# Patient Record
Sex: FEMALE | Race: WHITE | NOT HISPANIC OR LATINO | Employment: UNEMPLOYED | URBAN - METROPOLITAN AREA
[De-identification: names, ages, dates, MRNs, and addresses within clinical notes are randomized per-mention and may not be internally consistent; named-entity substitution may affect disease eponyms.]

---

## 2020-07-02 ENCOUNTER — OFFICE VISIT (OUTPATIENT)
Dept: PRIMARY CARE CLINIC | Facility: CLINIC | Age: 25
End: 2020-07-02
Payer: MEDICAID

## 2020-07-02 VITALS
BODY MASS INDEX: 29.22 KG/M2 | HEIGHT: 60 IN | WEIGHT: 148.81 LBS | OXYGEN SATURATION: 99 % | DIASTOLIC BLOOD PRESSURE: 64 MMHG | TEMPERATURE: 98 F | SYSTOLIC BLOOD PRESSURE: 122 MMHG | RESPIRATION RATE: 18 BRPM | HEART RATE: 88 BPM

## 2020-07-02 DIAGNOSIS — F17.210 CIGARETTE SMOKER: ICD-10-CM

## 2020-07-02 DIAGNOSIS — S42.292K OTHER CLOSED DISPLACED FRACTURE OF PROXIMAL END OF LEFT HUMERUS WITH NONUNION, SUBSEQUENT ENCOUNTER: Primary | ICD-10-CM

## 2020-07-02 PROCEDURE — 99203 OFFICE O/P NEW LOW 30 MIN: CPT | Mod: S$GLB,,, | Performed by: NURSE PRACTITIONER

## 2020-07-02 PROCEDURE — 99203 PR OFFICE/OUTPT VISIT, NEW, LEVL III, 30-44 MIN: ICD-10-PCS | Mod: S$GLB,,, | Performed by: NURSE PRACTITIONER

## 2020-07-02 RX ORDER — ACETAMINOPHEN AND CODEINE PHOSPHATE 300; 30 MG/1; MG/1
TABLET ORAL
COMMUNITY
Start: 2020-06-26

## 2020-07-02 RX ORDER — PROMETHAZINE HYDROCHLORIDE 25 MG/1
TABLET ORAL
COMMUNITY
Start: 2020-06-26

## 2020-07-02 NOTE — PROGRESS NOTES
Subjective:       Patient ID: Marielos Perla is a 24 y.o. female.    Chief Complaint: Establish Care and Referral (Orthopedic )    HPI:    Presents with c/o left fracture to humerus. Reports fell off of roof, Went to  3 days ago, xray revealed fracture humerus and splinted arm. Needs referral to Ortho. Pain is controlled with meds received from . Denies any other issues today.         History reviewed. No pertinent past medical history.    Past Surgical History:   Procedure Laterality Date    TYMPANOSTOMY TUBE PLACEMENT Bilateral        History reviewed. No pertinent family history.    Social History     Tobacco Use    Smoking status: Current Every Day Smoker     Packs/day: 0.25     Types: Cigarettes    Smokeless tobacco: Never Used   Substance Use Topics    Alcohol use: Yes     Frequency: 2-3 times a week     Drinks per session: 7 to 9     Binge frequency: Weekly    Drug use: Not Currently       Patient Active Problem List   Diagnosis    Cigarette smoker         There is no immunization history on file for this patient.        Review of Systems   Constitutional: Negative for activity change, chills, diaphoresis, fatigue and fever.   HENT: Negative for ear pain, mouth sores, nosebleeds and trouble swallowing.    Eyes: Negative for pain and visual disturbance.   Respiratory: Negative for cough, chest tightness and shortness of breath.    Cardiovascular: Negative for chest pain, palpitations and leg swelling.   Gastrointestinal: Negative for abdominal distention, abdominal pain and blood in stool.   Endocrine: Negative for polydipsia, polyphagia and polyuria.   Genitourinary: Negative for flank pain, frequency and hematuria.   Musculoskeletal: Negative for gait problem, neck pain and neck stiffness.   Skin: Negative for color change and pallor.   Neurological: Negative for dizziness, syncope, light-headedness and headaches.   Hematological: Negative for adenopathy. Does not bruise/bleed easily.    Psychiatric/Behavioral: Negative for confusion and suicidal ideas.     Objective:     Vitals:    07/02/20 1415   BP: 122/64   BP Location: Right arm   Patient Position: Sitting   BP Method: Large (Manual)   Pulse: 88   Resp: 18   Temp: 98.1 °F (36.7 °C)   TempSrc: Temporal   SpO2: 99%   Weight: 67.5 kg (148 lb 12.8 oz)   Height: 5' (1.524 m)       Physical Exam  Vitals signs and nursing note reviewed.   Constitutional:       General: She is not in acute distress.     Appearance: She is well-developed. She is not diaphoretic.   HENT:      Head: Normocephalic and atraumatic.      Mouth/Throat:      Mouth: Mucous membranes are moist. Mucous membranes are not pale, not dry and not cyanotic.   Eyes:      Conjunctiva/sclera: Conjunctivae normal.      Pupils: Pupils are equal, round, and reactive to light.   Neck:      Musculoskeletal: Normal range of motion and neck supple.      Thyroid: No thyromegaly.      Vascular: No JVD.      Trachea: No tracheal deviation.   Cardiovascular:      Rate and Rhythm: Normal rate and regular rhythm.      Pulses: Normal pulses.      Heart sounds: Normal heart sounds. No murmur.   Pulmonary:      Effort: Pulmonary effort is normal. No respiratory distress.      Breath sounds: Normal breath sounds. No stridor. No wheezing or rales.   Abdominal:      General: Bowel sounds are normal. There is no distension.      Palpations: Abdomen is soft.      Tenderness: There is no abdominal tenderness. There is no guarding.   Musculoskeletal:      Left elbow: She exhibits decreased range of motion. Tenderness found.        Arms:    Lymphadenopathy:      Cervical: No cervical adenopathy.   Skin:     General: Skin is warm and dry.      Capillary Refill: Capillary refill takes less than 2 seconds.      Coloration: Skin is not jaundiced.      Findings: No bruising.   Neurological:      Mental Status: She is alert and oriented to person, place, and time.   Psychiatric:         Mood and Affect: Mood normal.          No visits with results within 6 Month(s) from this visit.   Latest known visit with results is:   No results found for any previous visit.         Assessment:      1. Other closed displaced fracture of proximal end of left humerus with nonunion, subsequent encounter    2. Cigarette smoker    3. 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        There are no discontinued medications.    Health Maintenance   Topic Date Due    Pap Smear  12/10/2022    TETANUS VACCINE  11/12/2029    Lipid Panel  Completed    Pneumococcal Vaccine (Medium Risk)  Discontinued    HPV Vaccines  Discontinued       Patient Instructions     RTC PRN or soon for annual physical exam.      Understanding a Humerus Fracture      When you have a humerus fracture, it means that your upper arm bone is broken.This type of fracture most often occurs along the middle of the bone or at the end of the bone near the shoulder. Less often, it occurs at the end of the bone near the elbow. This mainly happens in kids or young adults.  The bone may be cracked, or it may be broken into 2 or more pieces. The pieces of bone may be lined up or they may have moved out of place. Sometimes, the bone may break through the skin. Nearby nerves, tissues, and joints also may be damaged. Depending on the severity of the fracture, healing may take several months or longer.  What causes a humerus  fracture?  A humerus fracture is most often the result of trauma. This may be from a fall, blow, accident, or sports injury.  Symptoms of a humerus fracture  Symptoms can include pain, swelling, and bruising. If the bone breaks through the skin,  bleeding can occur at the site. It may be hard to move and use the shoulder, arm, or elbow as you would normally.  Treating a humerus fracture  Treatment depends on where the bone is broken and how serious the break is. If needed, the bone is put back into place. This may be done with or without surgery. If surgery is needed, the surgeon may use devices such as pins, plates, or screws to hold the bone together. You will then wear a sling, splint, or cast to keep the bone in place and protect it from injury during healing. Other treatments may be also used to help reduce symptoms or regain function. These include:  · Cold packs. Putting an ice pack on the injured area may help reduce swelling and pain.  · Pain medicines. Taking prescription or over-the-counter pain medicines may help reduce pain and swelling.  · Exercises. Doing certain exercises at home or with a physical therapist can help improve strength, flexibility, and range of motion in the shoulder, arm, or elbow.  Possible complications of a humerus fracture  These can include:  · Poor healing of the bone  · Weakness, stiffness, or loss of range of motion in the shoulder, arm, or elbow  · Osteoarthritis in the shoulder or elbow  When to call your healthcare provider  Call your healthcare provider right away if you have any of these:  · Fever of 100.4°F (38°C) or higher, or as directed  · Symptoms that dont get better with treatment, or get worse  · Numbness, tingling, coldness, or swelling in your arm, hand, or fingers  · Fingernails that turn blue or gray in color  · A sling, splint, or cast that is damaged or feels too tight or loose  · New symptoms   Date Last Reviewed: 3/10/2016  © 9613-4637 [employer] [place]. [address] [place] [address], [place] [address]. All rights reserved. This information is not intended as a substitute for professional medical care. Always follow your healthcare professional's instructions.        Greater than 50% time spent educating patient on plan of care, medications, treatment options, testings, and prevention; > 20min/30min.       Risks, benefits, and alternatives discussed with patient, Patient verbalized understanding of discussed plan of care. Asked patient if any further questions, answered no.    No future appointments.      ANEUDY CedeñoC

## 2020-07-02 NOTE — PATIENT INSTRUCTIONS
RTC PRN or soon for annual physical exam.      Understanding a Humerus Fracture      When you have a humerus fracture, it means that your upper arm bone is broken.This type of fracture most often occurs along the middle of the bone or at the end of the bone near the shoulder. Less often, it occurs at the end of the bone near the elbow. This mainly happens in kids or young adults.  The bone may be cracked, or it may be broken into 2 or more pieces. The pieces of bone may be lined up or they may have moved out of place. Sometimes, the bone may break through the skin. Nearby nerves, tissues, and joints also may be damaged. Depending on the severity of the fracture, healing may take several months or longer.  What causes a humerus fracture?  A humerus fracture is most often the result of trauma. This may be from a fall, blow, accident, or sports injury.  Symptoms of a humerus fracture  Symptoms can include pain, swelling, and bruising. If the bone breaks through the skin,  bleeding can occur at the site. It may be hard to move and use the shoulder, arm, or elbow as you would normally.  Treating a humerus fracture  Treatment depends on where the bone is broken and how serious the break is. If needed, the bone is put back into place. This may be done with or without surgery. If surgery is needed, the surgeon may use devices such as pins, plates, or screws to hold the bone together. You will then wear a sling, splint, or cast to keep the bone in place and protect it from injury during healing. Other treatments may be also used to help reduce symptoms or regain function. These include:  · Cold packs. Putting an ice pack on the injured area may help reduce swelling and pain.  · Pain medicines. Taking prescription or over-the-counter pain medicines may help reduce pain and swelling.  · Exercises. Doing certain exercises at home or with a physical therapist can help improve strength, flexibility, and range of motion in the  shoulder, arm, or elbow.  Possible complications of a humerus fracture  These can include:  · Poor healing of the bone  · Weakness, stiffness, or loss of range of motion in the shoulder, arm, or elbow  · Osteoarthritis in the shoulder or elbow  When to call your healthcare provider  Call your healthcare provider right away if you have any of these:  · Fever of 100.4°F (38°C) or higher, or as directed  · Symptoms that dont get better with treatment, or get worse  · Numbness, tingling, coldness, or swelling in your arm, hand, or fingers  · Fingernails that turn blue or gray in color  · A sling, splint, or cast that is damaged or feels too tight or loose  · New symptoms   Date Last Reviewed: 3/10/2016  © 5082-3569 [employer]. [address] [place] [address], [place] [address]. All rights reserved. This information is not intended as a substitute for professional medical care. Always follow your healthcare professional's instructions.

## 2020-07-09 ENCOUNTER — OFFICE VISIT (OUTPATIENT)
Dept: ORTHOPEDICS | Facility: CLINIC | Age: 25
End: 2020-07-09
Payer: MEDICAID

## 2020-07-09 VITALS — TEMPERATURE: 98 F | HEIGHT: 60 IN | BODY MASS INDEX: 29.06 KG/M2 | WEIGHT: 148 LBS

## 2020-07-09 DIAGNOSIS — S42.409A CLOSED FRACTURE OF DISTAL END OF HUMERUS, UNSPECIFIED FRACTURE MORPHOLOGY, INITIAL ENCOUNTER: ICD-10-CM

## 2020-07-09 PROCEDURE — 99203 PR OFFICE/OUTPT VISIT, NEW, LEVL III, 30-44 MIN: ICD-10-PCS | Mod: S$GLB,,, | Performed by: ORTHOPAEDIC SURGERY

## 2020-07-09 PROCEDURE — 99203 OFFICE O/P NEW LOW 30 MIN: CPT | Mod: S$GLB,,, | Performed by: ORTHOPAEDIC SURGERY

## 2020-07-09 RX ORDER — HYDROCODONE BITARTRATE AND ACETAMINOPHEN 5; 325 MG/1; MG/1
1 TABLET ORAL EVERY 12 HOURS PRN
Qty: 14 TABLET | Refills: 0 | Status: SHIPPED | OUTPATIENT
Start: 2020-07-09

## 2020-07-09 NOTE — LETTER
July 9, 2020      Lelia Potter, FNP-C  [address]  [facility]  [place] [address]           [facility]  [address]  [place] [address]  Phone: [phone]  Fax: [phone]          Patient: Marielos Perla   MR Number: [id number]   YOB: 1995   Date of Visit: 7/9/2020       Dear Lelia Potter:    Thank you for referring Marielos Perla to me for evaluation. Attached you will find relevant portions of my assessment and plan of care.    If you have questions, please do not hesitate to call me. I look forward to following Marielos Perla along with you.    Sincerely,    Reza Umaña MD    Enclosure  CC:  No Recipients    If you would like to receive this communication electronically, please contact [email or web address] or [phone] to request more information on [employer] Link access.    For providers and/or their staff who would like to refer a patient to [facility], please contact us through our one-stop-shop provider referral line, [facility], at [phone].    If you feel you have received this communication in error or would no longer like to receive these types of communications, please e-mail [email or web address]

## 2020-07-09 NOTE — PROGRESS NOTES
Subjective:      Patient ID: Marielos Perla is a 24 y.o. female.    Chief Complaint: Pain of the Left Elbow    HPI 24-year-old lady who fell off her roof full month ago and injured her left elbow.  She was x-rayed 2 weeks ago and x-rays show a minimally displaced avulsion type fracture of from the lateral epicondyle.  She complains of pain and stiffness  Review of Systems   Constitution: Negative for fever and weight loss.   Cardiovascular: Negative for chest pain and leg swelling.   Musculoskeletal: Positive for joint pain, joint swelling and stiffness. Negative for arthritis and muscle weakness.   Gastrointestinal: Negative for change in bowel habit.   Genitourinary: Negative for bladder incontinence and hematuria.   Neurological: Negative for focal weakness, numbness, paresthesias and sensory change.         Objective:      Physical examination shows tenderness of the medial epicondyle.  She has pain with valgus stress of the elbow.  She has range of motion from -15 to 100 degrees.  She has normal sensation and normal pulses      Ortho/SPM Exam            Assessment:       Encounter Diagnosis   Name Primary?    Closed fracture of distal end of humerus, unspecified fracture morphology, initial encounter           Plan:       Marielos was seen today for pain.    Diagnoses and all orders for this visit:    Closed fracture of distal end of humerus, unspecified fracture morphology, initial encounter  Comments:  refer to Ortho  Orders:  -     Ambulatory referral/consult to Orthopedics    Other orders  -     HYDROcodone-acetaminophen (NORCO) 5-325 mg per tablet; Take 1 tablet by mouth every 12 (twelve) hours as needed for Pain.      Arrangements were made for a course of physical therapy to re-gain her motion.  Return 3 weeks for recheck

## 2020-08-11 ENCOUNTER — OFFICE VISIT (OUTPATIENT)
Dept: OBSTETRICS AND GYNECOLOGY | Facility: CLINIC | Age: 25
End: 2020-08-11
Payer: MEDICAID

## 2020-08-11 VITALS
DIASTOLIC BLOOD PRESSURE: 67 MMHG | BODY MASS INDEX: 29.84 KG/M2 | SYSTOLIC BLOOD PRESSURE: 103 MMHG | HEIGHT: 60 IN | WEIGHT: 152 LBS | HEART RATE: 68 BPM

## 2020-08-11 DIAGNOSIS — Z72.0 TOBACCO USE: ICD-10-CM

## 2020-08-11 DIAGNOSIS — Z01.419 ENCOUNTER FOR ANNUAL ROUTINE GYNECOLOGICAL EXAMINATION: Primary | ICD-10-CM

## 2020-08-11 DIAGNOSIS — F12.10 TETRAHYDROCANNABINOL (THC) USE DISORDER, MILD, ABUSE: ICD-10-CM

## 2020-08-11 DIAGNOSIS — A60.9 HSV (HERPES SIMPLEX VIRUS) ANOGENITAL INFECTION: ICD-10-CM

## 2020-08-11 PROCEDURE — 99395 PREV VISIT EST AGE 18-39: CPT | Mod: S$GLB,,, | Performed by: STUDENT IN AN ORGANIZED HEALTH CARE EDUCATION/TRAINING PROGRAM

## 2020-08-11 PROCEDURE — 99395 PR PREVENTIVE VISIT,EST,18-39: ICD-10-PCS | Mod: S$GLB,,, | Performed by: STUDENT IN AN ORGANIZED HEALTH CARE EDUCATION/TRAINING PROGRAM

## 2020-08-11 RX ORDER — VALACYCLOVIR HYDROCHLORIDE 500 MG/1
500 TABLET, FILM COATED ORAL 2 TIMES DAILY
Qty: 10 TABLET | Refills: 6 | Status: SHIPPED | OUTPATIENT
Start: 2020-08-11 | End: 2020-08-16

## 2020-08-11 NOTE — PROGRESS NOTES
Chief Complaint: Annual Exam    HPI: 24 y.o.  here for Annual Exam. She reports being treated for HSV earlier this year and would like to have medication available to her should she have a recurrent lesion. She also reports being tested for HIV q 3 months 2/2 to her partner possibly having HIV. She has no other complaints today.     Review of Systems   Constitutional: Negative for chills and fever.   HENT: Negative for congestion and sore throat.    Respiratory: Negative for cough and shortness of breath.    Cardiovascular: Negative for chest pain and palpitations.   Gastrointestinal: Negative for abdominal pain, nausea and vomiting.   Genitourinary: Negative for dysuria and frequency.   Musculoskeletal: Negative for back pain and joint pain (recent injury to elbow).   Skin: Negative for itching and rash.   Neurological: Negative for weakness and headaches.   Psychiatric/Behavioral: Negative for depression and suicidal ideas.     Past Medical History: denies  Past Surgical History: denies  Past OB History:       - 6lb  Past Gyn History: + H/o HPV on pap smear in the past, most recent negative from OSH, denies STD's, menstrual history as above.   Contraception History: none  Social History: + tobacco use - 1 pack/week, Occasional EtOH use, + THC use   Family History denies breast, colon, ovarian, or uterine cancer  Medications: none  Allergies: sulfa drugs - swelling    Physical Exam:  Vitals:    20 1402   BP: 103/67   Pulse: 68   Weight: 68.9 kg (152 lb)   Height: 5' (1.524 m)     Body mass index is 29.69 kg/m².  Physical Exam   Constitutional: She is oriented to person, place, and time and well-developed, well-nourished, and in no distress.   HENT:   Head: Normocephalic and atraumatic.   Eyes: Pupils are equal, round, and reactive to light. EOM are normal.   Neck: Normal range of motion. Neck supple.   Pulmonary/Chest: Effort normal. No respiratory distress. Right breast exhibits no inverted  nipple, no mass, no nipple discharge, no skin change and no tenderness. Left breast exhibits no inverted nipple, no mass, no nipple discharge, no skin change and no tenderness. Breasts are symmetrical.   Abdominal: Soft. She exhibits no distension. There is no abdominal tenderness.   Genitourinary: Right adnexum displays no mass and no tenderness. Left adnexum displays no mass and no tenderness.    Genitourinary Comments: NEFG, no masses or lesions, vagina pink with rugae, no VD or VB, non friable cervix  BME: uterus about 6 weeks in size, mobile, non tender     Musculoskeletal: Normal range of motion.         General: No tenderness or edema.   Neurological: She is alert and oriented to person, place, and time.   Skin: Skin is warm and dry.      Labs / Significant Studies  Pap: 2019, HAILEY signed for results    ASSESSMENT:   Patient is a 24 y.o. . who presents for annual exam     Patient Active Problem List   Diagnosis    Cigarette smoker    h/o HSV (herpes simplex virus) anogenital infection    Tobacco use    Tetrahydrocannabinol (THC) use disorder, mild, abuse     PLAN:  GC/CZ today  HAILEY signed today for pap smear results  HIV testing - pt to continue testing q 3 months, encouraged pt to have partner tested, if negative she can stop the q 3 month HIV testing   Will give prescription for valtrex prn today  Pt offered contraception, pt declined   Counseled on tobacco and THC cessation   Pre-conception counseling- folic acid and PNV  Counseling on self-breast exams, diet, and exercise.

## 2020-08-14 LAB
CHLAMYDIA: NEGATIVE
GONORRHEA: NEGATIVE
SOURCE: NORMAL
SOURCE: NORMAL
TRICHOMONAS AMPLIFIED: NEGATIVE

## 2024-10-03 ENCOUNTER — OFFICE VISIT (OUTPATIENT)
Dept: OBSTETRICS AND GYNECOLOGY | Facility: CLINIC | Age: 29
End: 2024-10-03
Payer: MEDICAID

## 2024-10-03 VITALS
HEART RATE: 79 BPM | WEIGHT: 156.38 LBS | DIASTOLIC BLOOD PRESSURE: 66 MMHG | BODY MASS INDEX: 30.54 KG/M2 | SYSTOLIC BLOOD PRESSURE: 114 MMHG

## 2024-10-03 DIAGNOSIS — Z12.4 SCREENING FOR CERVICAL CANCER: ICD-10-CM

## 2024-10-03 DIAGNOSIS — N83.209 CYST OF OVARY, UNSPECIFIED LATERALITY: Primary | ICD-10-CM

## 2024-10-03 PROCEDURE — 3008F BODY MASS INDEX DOCD: CPT | Mod: CPTII,,, | Performed by: STUDENT IN AN ORGANIZED HEALTH CARE EDUCATION/TRAINING PROGRAM

## 2024-10-03 PROCEDURE — 1159F MED LIST DOCD IN RCRD: CPT | Mod: CPTII,,, | Performed by: STUDENT IN AN ORGANIZED HEALTH CARE EDUCATION/TRAINING PROGRAM

## 2024-10-03 PROCEDURE — 3078F DIAST BP <80 MM HG: CPT | Mod: CPTII,,, | Performed by: STUDENT IN AN ORGANIZED HEALTH CARE EDUCATION/TRAINING PROGRAM

## 2024-10-03 PROCEDURE — 99203 OFFICE O/P NEW LOW 30 MIN: CPT | Mod: S$PBB,,, | Performed by: STUDENT IN AN ORGANIZED HEALTH CARE EDUCATION/TRAINING PROGRAM

## 2024-10-03 PROCEDURE — 1160F RVW MEDS BY RX/DR IN RCRD: CPT | Mod: CPTII,,, | Performed by: STUDENT IN AN ORGANIZED HEALTH CARE EDUCATION/TRAINING PROGRAM

## 2024-10-03 PROCEDURE — 3074F SYST BP LT 130 MM HG: CPT | Mod: CPTII,,, | Performed by: STUDENT IN AN ORGANIZED HEALTH CARE EDUCATION/TRAINING PROGRAM

## 2024-10-03 NOTE — PROGRESS NOTES
Chief Complaint:  ovarian cyst    HPI: Patient is a 29 y.o. y.o. female  who presents to GYN clinic for history of ovarian cyst.  Patient reports she has been seen multiple times in the past with complaints of ovarian cyst.  She reports she has had a deal with this since she was a teenager.  She was told most recently at the ED at another facility that she had ovarian cyst.  She reports she had a CT and ultrasounds, she was told that she had a small amount of free fluid on 1 side and a small cyst on the other.  She was counseled on treatment options with OCPs, patient voices understanding however declined.  She was also concerned about possible development into ovarian cancer and was enquiring about screening.  She denies any family history of ovarian or breast cancer today.  She has no other complaints today..    Review of Systems   Constitutional:  Negative for chills and fever.   HENT:  Negative for congestion and sore throat.    Respiratory:  Negative for cough and shortness of breath.    Cardiovascular:  Negative for chest pain and palpitations.   Gastrointestinal:  Negative for abdominal pain, nausea and vomiting.   Genitourinary:  Negative for dysuria, frequency and urgency.   Musculoskeletal:  Negative for back pain.   Skin:  Negative for itching and rash.   Neurological:  Negative for headaches.   All other systems reviewed and are negative.    PMH: HSV  PSH: ear surgery  Obhx:   GYNhx: + abnormal pap smears, denies STD's  Social hx: + t/d/e, THC use  Family hx: denies breast, colon, ovarian or uterine cancers.   Review of patient's allergies indicates:   Allergen Reactions    Sulfa (sulfonamide antibiotics)      Current Outpatient Medications   Medication Instructions    acetaminophen-codeine 300-30mg (TYLENOL #3) 300-30 mg Tab No dose, route, or frequency recorded.    HYDROcodone-acetaminophen (NORCO) 5-325 mg per tablet 1 tablet, Oral, Every 12 hours PRN    promethazine (PHENERGAN) 25 MG tablet  No dose, route, or frequency recorded.    valACYclovir (VALTREX) 500 mg, Oral, 2 times daily     Physical Exam:  Vitals:    10/03/24 0905   BP: 114/66   Pulse: 79   Weight: 70.9 kg (156 lb 6.4 oz)     Gen: NAD, well developed, well nourished  Psych: alert and oriented x 3, normal affect  HEENT: normocephalic, atraumatic  Abd: soft, non-tender, non-distended  Pelvic:  Normal external female genitalia, no masses or lesions, vagina pink with rugated, no vaginal bleeding or discharge, not friable cervix  Skin: Warm and dry  Ext: no c/c/c, moves all extremities  Neurological: normal gait, gross motor function intact    Assessment: Patient is a 29 y.o. y.o. female  with  Patient Active Problem List   Diagnosis    Cigarette smoker    h/o HSV (herpes simplex virus) anogenital infection    Tobacco use    Tetrahydrocannabinol (THC) use disorder, mild, abuse       Plan:  Lab patient has signed release of information today for outside ED records, after review will notify patient of any further follow up is needed  Patient given reassurance today   Patient counseled OCPs for treatment of ovarian cyst, patient voices understanding and declined   We discussed at the present time there was no screening for ovarian cancer, patient voices understanding   Patient return to clinic for annual or sooner if needed    Gee Ramirez MD

## 2024-10-10 ENCOUNTER — PATIENT MESSAGE (OUTPATIENT)
Dept: OBSTETRICS AND GYNECOLOGY | Facility: CLINIC | Age: 29
End: 2024-10-10
Payer: MEDICAID

## 2024-10-10 PROBLEM — R87.619 ATYPICAL GLANDULAR CELLS OF UNDETERMINED SIGNIFICANCE (AGUS) ON CERVICAL PAP SMEAR: Status: ACTIVE | Noted: 2024-10-10

## 2024-10-14 ENCOUNTER — TELEPHONE (OUTPATIENT)
Dept: OBSTETRICS AND GYNECOLOGY | Facility: CLINIC | Age: 29
End: 2024-10-14

## 2024-10-14 ENCOUNTER — OFFICE VISIT (OUTPATIENT)
Dept: OBSTETRICS AND GYNECOLOGY | Facility: CLINIC | Age: 29
End: 2024-10-14
Payer: MEDICAID

## 2024-10-14 VITALS
HEART RATE: 107 BPM | DIASTOLIC BLOOD PRESSURE: 77 MMHG | SYSTOLIC BLOOD PRESSURE: 122 MMHG | BODY MASS INDEX: 31.64 KG/M2 | WEIGHT: 162 LBS

## 2024-10-14 DIAGNOSIS — R87.619 ATYPICAL GLANDULAR CELLS OF UNDETERMINED SIGNIFICANCE (AGUS) ON CERVICAL PAP SMEAR: Primary | ICD-10-CM

## 2024-10-14 NOTE — TELEPHONE ENCOUNTER
Patient calling in regards to being in a lot of pain after her procedure today. She states that she took 600mg of ibuprofen and still reports pain. I did inform patient that she can take 800mg 3x's daily, but to keep us updated on the pain. Pt v/u      ----- Message from Faith sent at 10/14/2024 11:02 AM CDT -----  Patient is requesting a call back please call her back at [phone]

## 2024-10-14 NOTE — PROCEDURES
Colposcopy    Date/Time: 10/14/2024 8:15 AM    Performed by: Gee Ramirez MD  Authorized by: Gee Ramirez MD    Consent obatined:  Prior to procedure the appropriate consent was completed and verified  Timeout:Immediately prior to procedure a time out was called to verify the correct patient, procedure, equipment, support staff and site/side marked as required  Prep:Patient was prepped and draped in the usual sterile fashion  Assistants?: No      Colposcopy Site:  Cervix  Position:  Supine   Patient was prepped and draped in the normal sterile fashion.  Acrowhite Lesion: No    Atypical Vessels: No    Transformation Zone Adequate?: Yes    Biopsy?: Yes         Location:  Cervix ((11 00 (Mosaicism)))  ECC Performed?: Yes    LEEP Performed?: No    Estimated blood loss (cc):  20   Patient tolerated the procedure well with no immediate complications.   Post-operative instructions were provided for the patient.   Patient was discharged and will follow up if any complications occur  Endometrial biopsy    Date/Time: 10/14/2024 8:15 AM    Performed by: Gee Ramirez MD  Authorized by: eGe Ramirez MD    Consent:     Consent obtained:  Prior to procedure the appropriate consent was completed and verified    Consent given by:  Patient    Patient questions answered: yes      Patient agrees, verbalizes understanding, and wants to proceed: yes      Educational handouts given: yes      Instructions and paperwork completed: yes    Indication:     Indications: LIAM    Pre-procedure:     Pre-procedure timeout performed: yes    Procedure:     Procedure: endometrial biopsy with Pipelle      Cervix cleaned and prepped: yes      A paracervical block was performed: no      An intracervical block was performed: no      The cervix was dilated: no      Uterus sound depth (cm):  8    Curettes used:  1    Specimen collected: specimen collected and sent to pathology      Patient tolerated procedure well with no  complications: yes

## 2024-10-17 PROBLEM — D06.9 CIN III (CERVICAL INTRAEPITHELIAL NEOPLASIA III): Status: ACTIVE | Noted: 2024-10-17

## 2024-10-24 ENCOUNTER — OFFICE VISIT (OUTPATIENT)
Dept: OBSTETRICS AND GYNECOLOGY | Facility: CLINIC | Age: 29
End: 2024-10-24
Payer: MEDICAID

## 2024-10-24 VITALS
WEIGHT: 158.19 LBS | DIASTOLIC BLOOD PRESSURE: 77 MMHG | BODY MASS INDEX: 30.9 KG/M2 | HEART RATE: 96 BPM | SYSTOLIC BLOOD PRESSURE: 115 MMHG

## 2024-10-24 DIAGNOSIS — R87.613 HGSIL (HIGH GRADE SQUAMOUS INTRAEPITHELIAL LESION) ON PAP SMEAR OF CERVIX: ICD-10-CM

## 2024-10-24 DIAGNOSIS — D06.9 CIN III (CERVICAL INTRAEPITHELIAL NEOPLASIA III): Primary | ICD-10-CM

## 2024-10-24 PROCEDURE — 3074F SYST BP LT 130 MM HG: CPT | Mod: CPTII,,, | Performed by: STUDENT IN AN ORGANIZED HEALTH CARE EDUCATION/TRAINING PROGRAM

## 2024-10-24 PROCEDURE — 1159F MED LIST DOCD IN RCRD: CPT | Mod: CPTII,,, | Performed by: STUDENT IN AN ORGANIZED HEALTH CARE EDUCATION/TRAINING PROGRAM

## 2024-10-24 PROCEDURE — 99213 OFFICE O/P EST LOW 20 MIN: CPT | Mod: S$PBB,,, | Performed by: STUDENT IN AN ORGANIZED HEALTH CARE EDUCATION/TRAINING PROGRAM

## 2024-10-24 PROCEDURE — 3078F DIAST BP <80 MM HG: CPT | Mod: CPTII,,, | Performed by: STUDENT IN AN ORGANIZED HEALTH CARE EDUCATION/TRAINING PROGRAM

## 2024-10-24 PROCEDURE — 3008F BODY MASS INDEX DOCD: CPT | Mod: CPTII,,, | Performed by: STUDENT IN AN ORGANIZED HEALTH CARE EDUCATION/TRAINING PROGRAM

## 2024-10-24 PROCEDURE — 1160F RVW MEDS BY RX/DR IN RCRD: CPT | Mod: CPTII,,, | Performed by: STUDENT IN AN ORGANIZED HEALTH CARE EDUCATION/TRAINING PROGRAM

## 2024-10-24 NOTE — PROGRESS NOTES
Chief Complaint: KUSH III    HPI: Patient is a 29 y.o. y.o. female  who presents to GYN clinic for follow up.  Recently had a YASH Pap smear, she then had a colposcopy with biopsy and ECC, she also had an endometrial biopsy at that time.  Endometrial biopsy and ECC returned benign.  The colpo biopsy returned KUSH 3.  She presents today to discuss the findings.  As she has KUSH 3 now would recommend excisional procedure, patient agreeable.  She has no other complaints today.  Review of systems negative unless mentioned above..    Physical Exam:  Vitals:    10/24/24 0917   BP: 115/77   Pulse: 96   Weight: 71.8 kg (158 lb 3.2 oz)       Gen: NAD, well developed, well nourished  Psych: alert and oriented x 3, normal affect  HEENT: normocephalic, atraumatic  Abd: soft, non-tender, non-distended  Skin: warm and dry  Ext: no c/c/c, moves all extremities  Neurological: normal gait, gross motor function intact    Results:  A - ENDOMETRIAL BIOPSY:   BENIGN ENDOMETRIAL TISSUE WITH SCATTERED PLASMA CELLS, SUGGESTIVE OF CHRONIC ENDOMETRITIS.   NO MALIGNANCY IDENTIFIED.     B - CERVICAL BIOPSY 11:00:   HIGH-GRADE SQUAMOUS INTRAEPITHELIAL LESION (HSIL); ENCOMPASSING MODERATE AND SEVERE DYSPLASIA/KUSH II AND KUSH III/CIS WITH FOCAL EXTENSION INTO ENDOCERVICAL GLANDS.   CHRONIC INFLAMMATION.     C - ENDOCERVICAL CURETTAGE:   RARE DETACHED FRAGMENTS OF SQUAMOUS EPITHELIUM WITH DYSPLASIA, NOT FURTHER CLASSIFIABLE.   BENIGN FRAGMENTS OF ENDOCERVICAL GLANDULAR EPITHELIUM AND MUCUS.     Assessment: Patient is a 29 y.o. y.o. female  with  Patient Active Problem List   Diagnosis    Cigarette smoker    h/o HSV (herpes simplex virus) anogenital infection    Tobacco use    Tetrahydrocannabinol (THC) use disorder, mild, abuse    Atypical glandular cells of undetermined significance (LIAM) on cervical Pap smear    KUSH III (cervical intraepithelial neoplasia III)       Plan:  Patient counseled on her pathology, patient voices  understanding  Will KUSH 3 would recommend excisional procedure with conization of the cervix.  Patient is scheduled for surgery   Return to clinic for naye Ramirez MD

## 2024-12-02 ENCOUNTER — OFFICE VISIT (OUTPATIENT)
Dept: OBSTETRICS AND GYNECOLOGY | Facility: CLINIC | Age: 29
End: 2024-12-02
Payer: MEDICAID

## 2024-12-02 VITALS
WEIGHT: 161.63 LBS | HEART RATE: 76 BPM | BODY MASS INDEX: 31.56 KG/M2 | SYSTOLIC BLOOD PRESSURE: 117 MMHG | DIASTOLIC BLOOD PRESSURE: 66 MMHG

## 2024-12-02 DIAGNOSIS — Z01.818 PRE-OP EVALUATION: Primary | ICD-10-CM

## 2024-12-02 DIAGNOSIS — R87.619 ATYPICAL GLANDULAR CELLS OF UNDETERMINED SIGNIFICANCE (AGUS) ON CERVICAL PAP SMEAR: ICD-10-CM

## 2024-12-02 DIAGNOSIS — D06.9 CIN III (CERVICAL INTRAEPITHELIAL NEOPLASIA III): ICD-10-CM

## 2024-12-02 LAB
ANION GAP SERPL CALC-SCNC: 3 MMOL/L (ref 3–11)
APPEARANCE, UA: CLEAR
B-HCG UR QL: NEGATIVE
BASOPHILS NFR BLD: 0.9 % (ref 0–3)
BILIRUB UR QL STRIP: NEGATIVE MG/DL
BUN SERPL-MCNC: 8 MG/DL (ref 7–18)
BUN/CREAT SERPL: 12.5 RATIO (ref 7–18)
CALCIUM SERPL-MCNC: 9.3 MG/DL (ref 8.8–10.5)
CHLORIDE SERPL-SCNC: 102 MMOL/L (ref 100–108)
CO2 SERPL-SCNC: 33 MMOL/L (ref 21–32)
COLOR UR: ABNORMAL
CREAT SERPL-MCNC: 0.64 MG/DL (ref 0.55–1.02)
EOSINOPHIL NFR BLD: 3.6 % (ref 1–3)
ERYTHROCYTE [DISTWIDTH] IN BLOOD BY AUTOMATED COUNT: 12.7 % (ref 12.5–18)
GFR ESTIMATION: 123
GLUCOSE (UA): NORMAL MG/DL
GLUCOSE SERPL-MCNC: 87 MG/DL (ref 70–110)
HCT VFR BLD AUTO: 40.9 % (ref 37–47)
HGB BLD-MCNC: 14 G/DL (ref 12–16)
HGB UR QL STRIP: 25 /UL
KETONES UR QL STRIP: NEGATIVE MG/DL
LEUKOCYTE ESTERASE UR QL STRIP: NEGATIVE /UL
LYMPHOCYTES NFR BLD: 33 % (ref 25–40)
MCH RBC QN AUTO: 31 PG (ref 27–31.2)
MCHC RBC AUTO-ENTMCNC: 34.2 G/DL (ref 31.8–35.4)
MCV RBC AUTO: 90.5 FL (ref 80–97)
MONOCYTES NFR BLD: 6.5 % (ref 1–15)
NEUTROPHILS # BLD AUTO: 3.09 10*3/UL (ref 1.8–7.7)
NEUTROPHILS NFR BLD: 55.6 % (ref 37–80)
NITRITE UR QL STRIP: NEGATIVE
NUCLEATED RED BLOOD CELLS: 0 %
PH UR STRIP: 8 PH (ref 5–8)
PLATELETS: 304 10*3/UL (ref 142–424)
POTASSIUM SERPL-SCNC: 4.1 MMOL/L (ref 3.6–5.2)
PROT UR QL STRIP: NEGATIVE MG/DL
RBC # BLD AUTO: 4.52 10*6/UL (ref 4.2–5.4)
SODIUM BLD-SCNC: 138 MMOL/L (ref 135–145)
SP GR UR STRIP: 1.01 (ref 1–1.03)
SPECIMEN COLLECTION METHOD, URINE: ABNORMAL
UROBILINOGEN UR STRIP-ACNC: NORMAL MG/DL
WBC # BLD: 5.6 10*3/UL (ref 4.6–10.2)

## 2024-12-02 RX ORDER — OXYCODONE AND ACETAMINOPHEN 5; 325 MG/1; MG/1
1 TABLET ORAL EVERY 6 HOURS PRN
Qty: 10 TABLET | Refills: 0 | Status: SHIPPED | OUTPATIENT
Start: 2024-12-02

## 2024-12-02 RX ORDER — PROMETHAZINE HYDROCHLORIDE 12.5 MG/1
12.5 TABLET ORAL EVERY 6 HOURS PRN
Qty: 20 TABLET | Refills: 0 | Status: SHIPPED | OUTPATIENT
Start: 2024-12-02

## 2024-12-02 RX ORDER — IBUPROFEN 800 MG/1
800 TABLET ORAL 3 TIMES DAILY PRN
Qty: 30 TABLET | Refills: 0 | Status: SHIPPED | OUTPATIENT
Start: 2024-12-02

## 2024-12-02 NOTE — PROGRESS NOTES
Chief Complaint: pre op    HPI: Patient is a 29 y.o. y.o. female  who presents to GYN clinic for pre op.  Patient is scheduled for conization of the cervix secondary to KUSH 3 found on colposcopy.  She has no other complaints today.    Review of Systems   Constitutional:  Negative for chills and fever.   HENT:  Negative for congestion and sore throat.    Respiratory:  Negative for cough and shortness of breath.    Cardiovascular:  Negative for chest pain and palpitations.   Gastrointestinal:  Negative for abdominal pain, nausea and vomiting.   Genitourinary:  Negative for dysuria, frequency and urgency.   Musculoskeletal:  Negative for back pain.   Skin:  Negative for itching and rash.   Neurological:  Negative for headaches.   All other systems reviewed and are negative.    PMH: HSV  PSH: ear surgery  Obhx:   GYNhx: + abnormal pap smears, denies STD's  Social hx: + t/d/e, THC use  Family hx: denies breast, colon, ovarian or uterine cancers.   Review of patient's allergies indicates:   Allergen Reactions    Sulfa (sulfonamide antibiotics)      Current Outpatient Medications   Medication Instructions    acetaminophen-codeine 300-30mg (TYLENOL #3) 300-30 mg Tab No dose, route, or frequency recorded.    HYDROcodone-acetaminophen (NORCO) 5-325 mg per tablet 1 tablet, Oral, Every 12 hours PRN    promethazine (PHENERGAN) 25 MG tablet No dose, route, or frequency recorded.    valACYclovir (VALTREX) 500 mg, Oral, 2 times daily     Physical Exam:  Vitals:    24 1021   BP: 117/66   Pulse: 76   Weight: 73.3 kg (161 lb 9.6 oz)     Gen: NAD, well developed, well nourished  Psych: alert and oriented x 3, normal affect  HEENT: normocephalic, atraumatic  Abd: soft, non-tender, non-distended  Skin: warm and dry  Ext: no c/c/c, moves all extremities  Neurological: normal gait, gross motor function intact    Results:  A - ENDOMETRIAL BIOPSY:   BENIGN ENDOMETRIAL TISSUE WITH SCATTERED PLASMA CELLS, SUGGESTIVE OF CHRONIC  ENDOMETRITIS.   NO MALIGNANCY IDENTIFIED.     B - CERVICAL BIOPSY 11:00:   HIGH-GRADE SQUAMOUS INTRAEPITHELIAL LESION (HSIL); ENCOMPASSING MODERATE AND SEVERE DYSPLASIA/KUSH II AND KUSH III/CIS WITH FOCAL EXTENSION INTO ENDOCERVICAL GLANDS.   CHRONIC INFLAMMATION.     C - ENDOCERVICAL CURETTAGE:   RARE DETACHED FRAGMENTS OF SQUAMOUS EPITHELIUM WITH DYSPLASIA, NOT FURTHER CLASSIFIABLE.   BENIGN FRAGMENTS OF ENDOCERVICAL GLANDULAR EPITHELIUM AND MUCUS.     Assessment: Patient is a 29 y.o. y.o. female  with  Patient Active Problem List   Diagnosis    Cigarette smoker    h/o HSV (herpes simplex virus) anogenital infection    Tobacco use    Tetrahydrocannabinol (THC) use disorder, mild, abuse    Atypical glandular cells of undetermined significance (LIAM) on cervical Pap smear    KUSH III (cervical intraepithelial neoplasia III)     Plan:  Patient counseled on risks and benefits of the surgery, she voices understanding and desires to proceed  Consents signed and scanned today   Patient preop nurse and have lab work drawn   All questions answered   Return to clinic for 2 week postop visit    Gee Ramirez MD

## 2024-12-04 ENCOUNTER — OUTSIDE PLACE OF SERVICE (OUTPATIENT)
Dept: OBSTETRICS AND GYNECOLOGY | Facility: CLINIC | Age: 29
End: 2024-12-04
Payer: MEDICAID

## 2024-12-12 ENCOUNTER — TELEPHONE (OUTPATIENT)
Dept: OBSTETRICS AND GYNECOLOGY | Facility: CLINIC | Age: 29
End: 2024-12-12
Payer: MEDICAID

## 2024-12-12 NOTE — TELEPHONE ENCOUNTER
----- Message from Anisha sent at 12/12/2024 12:35 PM CST -----  Contact: [phone]  Type:  Needs Medical Advice    Who Called: Marielos   Symptoms (please be specific): discuss results   Would the patient rather a call back or a response via [facility]? Call back   Best Call Back Number: [phone]   Additional Information:

## 2024-12-12 NOTE — TELEPHONE ENCOUNTER
Returned patient's call left voicemail for her to return the call         Located within [facility]

## 2024-12-17 ENCOUNTER — OFFICE VISIT (OUTPATIENT)
Dept: OBSTETRICS AND GYNECOLOGY | Facility: CLINIC | Age: 29
End: 2024-12-17
Payer: MEDICAID

## 2024-12-17 VITALS — WEIGHT: 159 LBS | DIASTOLIC BLOOD PRESSURE: 71 MMHG | BODY MASS INDEX: 31.05 KG/M2 | SYSTOLIC BLOOD PRESSURE: 110 MMHG

## 2024-12-17 DIAGNOSIS — Z98.890 S/P CONIZATION OF CERVIX: Primary | ICD-10-CM

## 2024-12-17 NOTE — PROGRESS NOTES
Chief Complaint: post op    HPI: Patient is a 29 y.o. y.o. female  who presents to GYN clinic for follow up.  Post conization of the cervix for KUSH 3 and history of YASH Pap smear.  Patient doing well today.  She denies any pain or vaginal bleeding today she is ambulating, voiding, tolerating p.o. diet with difficulty.  Review of the pathology today, margins negative on her conization.  She has no other complaints today.  She does report she has completed childbearing.  Review of systems negative unless mentioned above     Physical Exam:  Vitals:    24 1320   BP: 110/71   Weight: 72.1 kg (159 lb)       Gen: NAD, well developed, well nourished  Psych: alert and oriented x 3, normal affect  HEENT: normocephalic, atraumatic  Abd: soft, non-tender, non-distended  Skin: warm and dry  Ext: no c/c/c, moves all extremities  Neurological: normal gait, gross motor function intact    Results:  See media for pathology    Assessment: Patient is a 29 y.o. y.o. female  with  Patient Active Problem List   Diagnosis    Cigarette smoker    h/o HSV (herpes simplex virus) anogenital infection    Tobacco use    Tetrahydrocannabinol (THC) use disorder, mild, abuse    Atypical glandular cells of undetermined significance (LIAM) on cervical Pap smear    KUSH III (cervical intraepithelial neoplasia III)       Plan:  Reviewed pathology with patient today, patient voices understanding   Patient with AIS on her conization, margins were negative   We discussed future and childbearing, she does not desire any future children   We discussed given her pathology at the recommendations after childbearing is completed would recommend hysterectomy secondary to AIS  Patient voices understanding   Patient return to clinic to schedule surgery in 6 weeks    Gee Ramirez MD

## 2025-01-30 ENCOUNTER — PATIENT MESSAGE (OUTPATIENT)
Dept: OBSTETRICS AND GYNECOLOGY | Facility: CLINIC | Age: 30
End: 2025-01-30

## 2025-01-30 ENCOUNTER — OFFICE VISIT (OUTPATIENT)
Dept: OBSTETRICS AND GYNECOLOGY | Facility: CLINIC | Age: 30
End: 2025-01-30
Payer: MEDICAID

## 2025-01-30 VITALS — SYSTOLIC BLOOD PRESSURE: 109 MMHG | WEIGHT: 154 LBS | DIASTOLIC BLOOD PRESSURE: 73 MMHG | BODY MASS INDEX: 30.08 KG/M2

## 2025-01-30 DIAGNOSIS — D06.9 ADENOCARCINOMA IN SITU (AIS) OF UTERINE CERVIX: Primary | ICD-10-CM

## 2025-01-30 PROCEDURE — 3008F BODY MASS INDEX DOCD: CPT | Mod: CPTII,,, | Performed by: STUDENT IN AN ORGANIZED HEALTH CARE EDUCATION/TRAINING PROGRAM

## 2025-01-30 PROCEDURE — 1159F MED LIST DOCD IN RCRD: CPT | Mod: CPTII,,, | Performed by: STUDENT IN AN ORGANIZED HEALTH CARE EDUCATION/TRAINING PROGRAM

## 2025-01-30 PROCEDURE — 1160F RVW MEDS BY RX/DR IN RCRD: CPT | Mod: CPTII,,, | Performed by: STUDENT IN AN ORGANIZED HEALTH CARE EDUCATION/TRAINING PROGRAM

## 2025-01-30 PROCEDURE — 3074F SYST BP LT 130 MM HG: CPT | Mod: CPTII,,, | Performed by: STUDENT IN AN ORGANIZED HEALTH CARE EDUCATION/TRAINING PROGRAM

## 2025-01-30 PROCEDURE — 3078F DIAST BP <80 MM HG: CPT | Mod: CPTII,,, | Performed by: STUDENT IN AN ORGANIZED HEALTH CARE EDUCATION/TRAINING PROGRAM

## 2025-01-30 PROCEDURE — 99213 OFFICE O/P EST LOW 20 MIN: CPT | Mod: S$PBB,24,, | Performed by: STUDENT IN AN ORGANIZED HEALTH CARE EDUCATION/TRAINING PROGRAM

## 2025-01-30 NOTE — PROGRESS NOTES
Chief Complaint: YASH    HPI: Patient is a 29 y.o. y.o. female  who presents to GYN clinic for follow up.  Patient presents today to discuss hysterectomy.  She does have a history YASH Pap smear.  EMB returned benign.  She would have a KUSH 3 lesion noted on her colposcopy underwent a conization of the cervix.  Margins were all negative.  Which has given her Pap smear and that she is done with childbearing recommendation will be for hysterectomy, patient agreeable with plan.  She has no other complaints today.  Review of systems negative unless mentioned above..    Physical Exam:  Vitals:    25 1021   BP: 109/73   Weight: 69.9 kg (154 lb)     Gen: NAD, well developed, well nourished  Psych: alert and oriented x 3, normal affect  HEENT: normocephalic, atraumatic  Abd: soft, non-tender, non-distended  Skin: Warm and dry  Ext: no c/c/c, moves all extremities  Neurological: normal gait, gross motor function intact    Assessment: Patient is a 29 y.o. y.o. female  with  Patient Active Problem List   Diagnosis    Cigarette smoker    h/o HSV (herpes simplex virus) anogenital infection    Tobacco use    Tetrahydrocannabinol (THC) use disorder, mild, abuse    Atypical glandular cells of undetermined significance (LIAM) on cervical Pap smear    KUSH III (cervical intraepithelial neoplasia III)     Plan:  Patient counseled previously about her Pap smear results and has underwent EMB and conization of the cervix.  EMB was benign, conization with negative margins   Discussed that after childbearing with her Pap smear history discussed possible need for hysterectomy, patient agreeable   Will schedule patient for laparoscopic hysterectomy today   Pelvic ultrasound ordered   Patient return to clinic for preop    Gee Ramirez MD

## 2025-02-07 DIAGNOSIS — R87.619 ABNORMAL CERVICAL PAPANICOLAOU SMEAR, UNSPECIFIED ABNORMAL PAP FINDING: Primary | ICD-10-CM

## 2025-02-10 ENCOUNTER — TELEPHONE (OUTPATIENT)
Dept: OBSTETRICS AND GYNECOLOGY | Facility: CLINIC | Age: 30
End: 2025-02-10
Payer: MEDICAID

## 2025-02-10 ENCOUNTER — PATIENT MESSAGE (OUTPATIENT)
Dept: OBSTETRICS AND GYNECOLOGY | Facility: CLINIC | Age: 30
End: 2025-02-10
Payer: MEDICAID

## 2025-02-10 NOTE — TELEPHONE ENCOUNTER
----- Message from Med Assistant Durant sent at 2/10/2025  9:01 AM CST -----  Contact: Marielos    ----- Message -----  From: Yuli Puentes  Sent: 2/10/2025   8:53 AM CST  To: James ALCARAZ Staff    Type:  Needs Medical Advice    Who Called: Marielos  Symptoms (please be specific): She is getting treatment for bladder infection, she wants to make   Pharmacy name and phone #:  [facility]  [address] [place] [address] [address], [place] [address]  Phone number: [phone]   Would the patient rather a call back or a response via [facility]? Call back  Best Call Back Number: [phone]  Thanks!

## 2025-02-11 ENCOUNTER — OFFICE VISIT (OUTPATIENT)
Dept: OBSTETRICS AND GYNECOLOGY | Facility: CLINIC | Age: 30
End: 2025-02-11
Payer: MEDICAID

## 2025-02-11 ENCOUNTER — PROCEDURE VISIT (OUTPATIENT)
Dept: OBSTETRICS AND GYNECOLOGY | Facility: CLINIC | Age: 30
End: 2025-02-11
Payer: MEDICAID

## 2025-02-11 VITALS — WEIGHT: 157 LBS | DIASTOLIC BLOOD PRESSURE: 78 MMHG | BODY MASS INDEX: 30.66 KG/M2 | SYSTOLIC BLOOD PRESSURE: 118 MMHG

## 2025-02-11 DIAGNOSIS — R87.619 ABNORMAL CERVICAL PAPANICOLAOU SMEAR, UNSPECIFIED ABNORMAL PAP FINDING: ICD-10-CM

## 2025-02-11 DIAGNOSIS — Z01.818 PRE-OP EVALUATION: Primary | ICD-10-CM

## 2025-02-11 DIAGNOSIS — D06.9 ADENOCARCINOMA IN SITU (AIS) OF UTERINE CERVIX: ICD-10-CM

## 2025-02-11 LAB
ANION GAP SERPL CALC-SCNC: 8 MMOL/L (ref 3–11)
APPEARANCE, UA: CLEAR
BASOPHILS NFR BLD: 0.7 % (ref 0–3)
BILIRUB UR QL STRIP: NEGATIVE MG/DL
BUN SERPL-MCNC: 13 MG/DL (ref 7–18)
BUN/CREAT SERPL: 18.05 RATIO (ref 7–18)
CALCIUM SERPL-MCNC: 9.2 MG/DL (ref 8.8–10.5)
CHLORIDE SERPL-SCNC: 105 MMOL/L (ref 100–108)
CO2 SERPL-SCNC: 30 MMOL/L (ref 21–32)
COLOR UR: ABNORMAL
CREAT SERPL-MCNC: 0.72 MG/DL (ref 0.55–1.02)
EOSINOPHIL NFR BLD: 1 % (ref 1–3)
ERYTHROCYTE [DISTWIDTH] IN BLOOD BY AUTOMATED COUNT: 13.2 % (ref 12.5–18)
GFR ESTIMATION: 117
GLUCOSE (UA): NORMAL MG/DL
GLUCOSE SERPL-MCNC: 86 MG/DL (ref 70–110)
HCG QUALITATIVE: NEGATIVE
HCT VFR BLD AUTO: 41.1 % (ref 37–47)
HGB BLD-MCNC: 14 G/DL (ref 12–16)
HGB UR QL STRIP: 10 /UL
KETONES UR QL STRIP: NEGATIVE MG/DL
LEUKOCYTE ESTERASE UR QL STRIP: NEGATIVE /UL
LYMPHOCYTES NFR BLD: 35.5 % (ref 25–40)
MCH RBC QN AUTO: 31.5 PG (ref 27–31.2)
MCHC RBC AUTO-ENTMCNC: 34.1 G/DL (ref 31.8–35.4)
MCV RBC AUTO: 92.4 FL (ref 80–97)
MONOCYTES NFR BLD: 6.8 % (ref 1–15)
NEUTROPHILS # BLD AUTO: 4.9 10*3/UL (ref 1.8–7.7)
NEUTROPHILS NFR BLD: 55.7 % (ref 37–80)
NITRITE UR QL STRIP: NEGATIVE
NUCLEATED RED BLOOD CELLS: 0 %
PH UR STRIP: 5 PH (ref 5–8)
PLATELETS: 319 10*3/UL (ref 142–424)
POTASSIUM SERPL-SCNC: 4.1 MMOL/L (ref 3.6–5.2)
PROT UR QL STRIP: NEGATIVE MG/DL
RBC # BLD AUTO: 4.45 10*6/UL (ref 4.2–5.4)
SODIUM BLD-SCNC: 143 MMOL/L (ref 135–145)
SP GR UR STRIP: 1.02 (ref 1–1.03)
SPECIMEN COLLECTION METHOD, URINE: ABNORMAL
UROBILINOGEN UR STRIP-ACNC: NORMAL MG/DL
WBC # BLD: 8.8 10*3/UL (ref 4.6–10.2)

## 2025-02-11 RX ORDER — IBUPROFEN 800 MG/1
800 TABLET ORAL 3 TIMES DAILY PRN
Qty: 30 TABLET | Refills: 0 | Status: SHIPPED | OUTPATIENT
Start: 2025-02-11

## 2025-02-11 RX ORDER — OXYCODONE AND ACETAMINOPHEN 5; 325 MG/1; MG/1
1 TABLET ORAL EVERY 6 HOURS PRN
Qty: 20 TABLET | Refills: 0 | Status: SHIPPED | OUTPATIENT
Start: 2025-02-11

## 2025-02-11 NOTE — PROGRESS NOTES
Chief Complaint: pre op    HPI: Patient is a 29 y.o. y.o. female  who presents to GYN clinic for pre op.  Patient is scheduled for laparoscopic hysterectomy secondary to present cervical pathology of AIS.  She reports she has completed childbearing and desires to proceed with surgery.  She has no other complaints today.    Review of Systems   Constitutional:  Negative for chills and fever.   HENT:  Negative for congestion and sore throat.    Respiratory:  Negative for cough and shortness of breath.    Cardiovascular:  Negative for chest pain and palpitations.   Gastrointestinal:  Negative for abdominal pain, nausea and vomiting.   Genitourinary:  Negative for dysuria, frequency and urgency.   Musculoskeletal:  Negative for back pain.   Skin:  Negative for itching and rash.   Neurological:  Negative for headaches.   All other systems reviewed and are negative.    PMH: HSV  PSH: ear surgery, [facility]  Obhx:   GYNhx: + abnormal pap smears, denies STD's  Social hx: + t/d/e, THC use  Family hx: denies breast, colon, ovarian or uterine cancers.   Review of patient's allergies indicates:   Allergen Reactions    Sulfa (sulfonamide antibiotics)      Current Outpatient Medications   Medication Instructions    acetaminophen-codeine 300-30mg (TYLENOL #3) 300-30 mg Tab No dose, route, or frequency recorded.    HYDROcodone-acetaminophen (NORCO) 5-325 mg per tablet 1 tablet, Oral, Every 12 hours PRN    ibuprofen (ADVIL,MOTRIN) 800 mg, Oral, 3 times daily PRN    oxyCODONE-acetaminophen (PERCOCET) 5-325 mg per tablet 1 tablet, Oral, Every 6 hours PRN    promethazine (PHENERGAN) 25 MG tablet No dose, route, or frequency recorded.    promethazine (PHENERGAN) 12.5 mg, Oral, Every 6 hours PRN    valACYclovir (VALTREX) 500 mg, Oral, 2 times daily     Physical Exam:  Vitals:    25 0856   BP: 118/78   Weight: 71.2 kg (157 lb)       Gen: NAD, well developed, well nourished  Psych: alert and oriented x 3, normal affect  HEENT:  normocephalic, atraumatic  Abd: soft, non-tender, non-distended  Skin: warm and dry  Ext: no c/c/c, moves all extremities  Neurological: normal gait, gross motor function intact    Results:  CKC - KUSH III with AIS, margins negative    Assessment: Patient is a 29 y.o. y.o. female  with  Patient Active Problem List   Diagnosis    Cigarette smoker    h/o HSV (herpes simplex virus) anogenital infection    Tobacco use    Tetrahydrocannabinol (THC) use disorder, mild, abuse    Atypical glandular cells of undetermined significance (LIAM) on cervical Pap smear    KUSH III (cervical intraepithelial neoplasia III)       Plan:  Today, consents for both blood and procedure were obtained.    The patient was thourgouhly counseled on the risks of TLH including but not limited to risk of damage to the surrounding organs and tissues, injury to vessels requiring acute blood loss and possible transfusion, injury to the bladder or ureters requiring prolonged or intermittent catherization, loss of fertility, possible conversion to open procedure, injury to the bowel resulting in permanent or reversible colostomy and postoperative complications including pain, infection, bleeding, possible wound breakdown or fistula formation. The patient does accept all these risks and persist in her desire for surgery at this time.   Patient may preop nurse and have lab work drawn   All questions answered   Return to clinic for 1 week postop visit    Gee Ramirez MD

## 2025-02-12 ENCOUNTER — TELEPHONE (OUTPATIENT)
Dept: OBSTETRICS AND GYNECOLOGY | Facility: CLINIC | Age: 30
End: 2025-02-12
Payer: MEDICAID

## 2025-02-12 NOTE — TELEPHONE ENCOUNTER
----- Message from Marleni sent at 2/12/2025  4:48 PM CST -----  Type:  Patient Returning Call    Who Called:Marielos Perla    Who Left Message for Patient:-  Does the patient know what this is regarding?:-  Would the patient rather a call back or a response via [facility]? -  Best Call Back Number: [phone]    Additional Information:  RTC to be reschedule for Hysterectomy please call

## 2025-02-25 ENCOUNTER — TELEPHONE (OUTPATIENT)
Dept: OBSTETRICS AND GYNECOLOGY | Facility: CLINIC | Age: 30
End: 2025-02-25
Payer: MEDICAID

## 2025-02-25 NOTE — TELEPHONE ENCOUNTER
Called pt to get her to come in and see Dr. Ramirez due to her stating she still had infection but patient stated she was out of town and could not come in today and she also stated she wanted to postpone her surgery due to her being out of work for 6 wks and she would loose her home due to being out after surgery

## 2025-03-31 ENCOUNTER — TELEPHONE (OUTPATIENT)
Dept: ADMINISTRATIVE | Facility: OTHER | Age: 30
End: 2025-03-31
Payer: MEDICAID

## 2025-03-31 NOTE — TELEPHONE ENCOUNTER
----- Message from Mary sent at 3/31/2025  2:40 PM CDT -----  Regarding: appointment access  Contact: alec  Type:  Sooner Apoointment RequestCaller is requesting a sooner appointment.  Caller declined first available appointment listed below.  Caller will not accept being placed on the waitlist and is requesting a message be sent to doctor.Name of Caller: Alec When is the first available appointment?Symptoms:Would the patient rather a call back or a response via [facility]? [facility]: [phone] (home)  Additional Information: Alec is ready to be scheduled for surgery. Please call today.

## 2025-04-10 ENCOUNTER — OFFICE VISIT (OUTPATIENT)
Dept: OBSTETRICS AND GYNECOLOGY | Facility: CLINIC | Age: 30
End: 2025-04-10
Payer: MEDICAID

## 2025-04-10 VITALS
SYSTOLIC BLOOD PRESSURE: 110 MMHG | BODY MASS INDEX: 30.35 KG/M2 | DIASTOLIC BLOOD PRESSURE: 70 MMHG | HEART RATE: 84 BPM | WEIGHT: 155.38 LBS

## 2025-04-10 DIAGNOSIS — Z01.818 PRE-OP EVALUATION: Primary | ICD-10-CM

## 2025-04-10 DIAGNOSIS — D06.9 ADENOCARCINOMA IN SITU (AIS) OF UTERINE CERVIX: ICD-10-CM

## 2025-04-10 PROCEDURE — 99214 OFFICE O/P EST MOD 30 MIN: CPT | Mod: S$PBB,,, | Performed by: STUDENT IN AN ORGANIZED HEALTH CARE EDUCATION/TRAINING PROGRAM

## 2025-04-10 PROCEDURE — 3008F BODY MASS INDEX DOCD: CPT | Mod: CPTII,,, | Performed by: STUDENT IN AN ORGANIZED HEALTH CARE EDUCATION/TRAINING PROGRAM

## 2025-04-10 PROCEDURE — 3078F DIAST BP <80 MM HG: CPT | Mod: CPTII,,, | Performed by: STUDENT IN AN ORGANIZED HEALTH CARE EDUCATION/TRAINING PROGRAM

## 2025-04-10 PROCEDURE — 3074F SYST BP LT 130 MM HG: CPT | Mod: CPTII,,, | Performed by: STUDENT IN AN ORGANIZED HEALTH CARE EDUCATION/TRAINING PROGRAM

## 2025-04-10 PROCEDURE — 1159F MED LIST DOCD IN RCRD: CPT | Mod: CPTII,,, | Performed by: STUDENT IN AN ORGANIZED HEALTH CARE EDUCATION/TRAINING PROGRAM

## 2025-04-10 PROCEDURE — 1160F RVW MEDS BY RX/DR IN RCRD: CPT | Mod: CPTII,,, | Performed by: STUDENT IN AN ORGANIZED HEALTH CARE EDUCATION/TRAINING PROGRAM

## 2025-04-10 NOTE — PROGRESS NOTES
HPI: Patient is a 29 y.o. y.o. female  who presents to GYN clinic for pre op.  Patient is scheduled for laparoscopic hysterectomy secondary to present cervical pathology of AIS.  She reports she has completed childbearing and desires to proceed with surgery.  She has no other complaints today.     Review of Systems   Constitutional:  Negative for chills and fever.   HENT:  Negative for congestion and sore throat.    Respiratory:  Negative for cough and shortness of breath.    Cardiovascular:  Negative for chest pain and palpitations.   Gastrointestinal:  Negative for abdominal pain, nausea and vomiting.   Genitourinary:  Negative for dysuria, frequency and urgency.   Musculoskeletal:  Negative for back pain.   Skin:  Negative for itching and rash.   Neurological:  Negative for headaches.   All other systems reviewed and are negative.     PMH: HSV  PSH: ear surgery, C  Obhx:   GYNhx: + abnormal pap smears, denies STD's  Social hx: + t/d/e, THC use  Family hx: denies breast, colon, ovarian or uterine cancers.   Review of patient's allergies indicates:   Allergen Reactions    Sulfa (sulfonamide antibiotics)      Current Outpatient Medications   Medication Instructions    ibuprofen (ADVIL,MOTRIN) 800 mg, Oral, 3 times daily PRN    oxyCODONE-acetaminophen (PERCOCET) 5-325 mg per tablet 1 tablet, Oral, Every 6 hours PRN    promethazine (PHENERGAN) 25 MG tablet No dose, route, or frequency recorded.    promethazine (PHENERGAN) 12.5 mg, Oral, Every 6 hours PRN    valACYclovir (VALTREX) 500 mg, Oral, 2 times daily     Physical Exam:  There were no vitals filed for this visit.    Gen: NAD, well developed, well nourished  Psych: alert and oriented x 3, normal affect  HEENT: normocephalic, atraumatic  Abd: soft, non-tender, non-distended  Skin: warm and dry  Ext: no c/c/c, moves all extremities  Neurological: normal gait, gross motor function intact    Results:  CKC - KUSH III with AIS, margins negative    Pelvic  US:  Uterus   ======   Uterus: Visualized   Uterus position: anteverted   Uterus length 72 mm   Uterus width 46 mm   Uterus height 38 mm   Uterus Vol 65.1 cmï¿½   Endometrial thickness, total 9.4 mm   Fibroids: No fibroids identified   Polyps: No polyps identified     Right Ovary   =========   Rt ovary: Not visualized     Left Ovary   ========   Lt ovary: Visualized   Lt ovary D1 21 mm   Lt ovary D2 13 mm   Lt ovary D3 13 mm   Lt ovary Vol 1.9 cmï¿½   Lt ovarian cyst(s): No cysts identified   Lt ovarian follicle(s): Follicles identified     Cul de Sac   =========   No free fluid visualized     Impression   =========   Uterus and Lt ovary appear WNL   Rt ovary not vis   **Pt refused TV exam, also was uncomfortable with any pressure applied to pelvic area due to bruising     Assessment: Patient is a 29 y.o. y.o. female  with  Problem List[1]    Plan:  Today, consents for both blood and procedure were obtained.    The patient was thourgouhly counseled on the risks of TLH including but not limited to risk of damage to the surrounding organs and tissues, injury to vessels requiring acute blood loss and possible transfusion, injury to the bladder or ureters requiring prolonged or intermittent catherization, loss of fertility, possible conversion to open procedure, injury to the bowel resulting in permanent or reversible colostomy and postoperative complications including pain, infection, bleeding, possible wound breakdown or fistula formation. The patient does accept all these risks and persist in her desire for surgery at this time.   Patient to see preop nurse and have lab work drawn   All questions answered   Return to clinic for 1 week postop visit    Gee Ramirez MD           [1]   Patient Active Problem List  Diagnosis    Cigarette smoker    h/o HSV (herpes simplex virus) anogenital infection    Tobacco use    Tetrahydrocannabinol (THC) use disorder, mild, abuse    Atypical glandular cells of undetermined  significance (LIAM) on cervical Pap smear    KUSH III (cervical intraepithelial neoplasia III)

## 2025-04-16 ENCOUNTER — OUTSIDE PLACE OF SERVICE (OUTPATIENT)
Dept: OBSTETRICS AND GYNECOLOGY | Facility: CLINIC | Age: 30
End: 2025-04-16

## 2025-04-16 ENCOUNTER — OUTSIDE PLACE OF SERVICE (OUTPATIENT)
Dept: OBSTETRICS AND GYNECOLOGY | Facility: CLINIC | Age: 30
End: 2025-04-16
Payer: MEDICAID

## 2025-04-21 ENCOUNTER — TELEPHONE (OUTPATIENT)
Dept: OBSTETRICS AND GYNECOLOGY | Facility: CLINIC | Age: 30
End: 2025-04-21

## 2025-04-21 ENCOUNTER — TELEPHONE (OUTPATIENT)
Dept: OBSTETRICS AND GYNECOLOGY | Facility: CLINIC | Age: 30
End: 2025-04-21
Payer: MEDICAID

## 2025-04-21 ENCOUNTER — OFFICE VISIT (OUTPATIENT)
Dept: OBSTETRICS AND GYNECOLOGY | Facility: CLINIC | Age: 30
End: 2025-04-21
Payer: MEDICAID

## 2025-04-21 DIAGNOSIS — G89.18 POSTOPERATIVE PAIN: ICD-10-CM

## 2025-04-21 DIAGNOSIS — Z90.710 STATUS POST HYSTERECTOMY: Primary | ICD-10-CM

## 2025-04-21 PROCEDURE — 1160F RVW MEDS BY RX/DR IN RCRD: CPT | Mod: CPTII,,, | Performed by: STUDENT IN AN ORGANIZED HEALTH CARE EDUCATION/TRAINING PROGRAM

## 2025-04-21 PROCEDURE — 99024 POSTOP FOLLOW-UP VISIT: CPT | Mod: ,,, | Performed by: STUDENT IN AN ORGANIZED HEALTH CARE EDUCATION/TRAINING PROGRAM

## 2025-04-21 PROCEDURE — 1159F MED LIST DOCD IN RCRD: CPT | Mod: CPTII,,, | Performed by: STUDENT IN AN ORGANIZED HEALTH CARE EDUCATION/TRAINING PROGRAM

## 2025-04-21 RX ORDER — PROMETHAZINE HYDROCHLORIDE 12.5 MG/1
12.5 TABLET ORAL EVERY 6 HOURS PRN
Qty: 30 TABLET | Refills: 0 | Status: SHIPPED | OUTPATIENT
Start: 2025-04-21

## 2025-04-21 RX ORDER — OXYCODONE AND ACETAMINOPHEN 5; 325 MG/1; MG/1
1 TABLET ORAL EVERY 6 HOURS PRN
Qty: 10 TABLET | Refills: 0 | Status: SHIPPED | OUTPATIENT
Start: 2025-04-21

## 2025-04-21 RX ORDER — HYOSCYAMINE SULFATE 0.12 MG/1
0.12 TABLET SUBLINGUAL EVERY 4 HOURS PRN
Qty: 30 TABLET | Refills: 1 | Status: SHIPPED | OUTPATIENT
Start: 2025-04-21

## 2025-04-21 NOTE — TELEPHONE ENCOUNTER
Spoke to pt she stated she is having stabbing pain and she can't sit down and she is having bowel movements on herself. Put pt to come in and see Dr. Ramirez today

## 2025-04-21 NOTE — TELEPHONE ENCOUNTER
Returned patient's call left voicemail for patient letting her know that pain medication  will be sent out the day of her procedure not before. She can take Tylenol for the pain for now until surgery.       [facility]

## 2025-04-21 NOTE — PROGRESS NOTES
Chief Complaint: post op pain    HPI: Patient is a 29 y.o. y.o. female  who presents to GYN clinic for follow up.  Patient is status post laparoscopic hysterectomy presents to clinic today for postop pain.  She called clinic today reporting a stabbing pain in her lower abdomen.  She is day 5 from her surgery.  She reports today that she has a sharp pain whenever she urinates her has a bowel movement.  She does report some general lower abdominal pain as well.  She denies any fevers at home.  Minimal vaginal bleeding.  She is able to ambulate and tolerate p.o. diet.  She has no other complaints today.  Review of systems negative unless mentioned above..    Physical Exam:  There were no vitals filed for this visit.    Gen: NAD, well developed, well nourished  Psych: alert and oriented x 3, normal affect  HEENT: normocephalic, atraumatic  Abd: soft, non-tender, non-distended, incisions healing well, umbilical incision noted to have bruise  Skin: Warm and dry  Ext: no c/c/c, moves all extremities  Neurological: normal gait, gross motor function intact    Assessment: Patient is a 29 y.o. y.o. female  with  Problem List[1]    Plan:  Order UA and CBC today   Patient counseled on possible causes of her pain with urination and bowel movement  Will prescribe Levsin today  Patient is afebrile clinic and denies any fevers at home  Also prescribe Percocet today along with Phenergan   Return to clinic in 3 days for follow up    Gee Ramirez MD             [1]   Patient Active Problem List  Diagnosis    Cigarette smoker    h/o HSV (herpes simplex virus) anogenital infection    Tobacco use    Tetrahydrocannabinol (THC) use disorder, mild, abuse    Atypical glandular cells of undetermined significance (LIAM) on cervical Pap smear    KUSH III (cervical intraepithelial neoplasia III)

## 2025-04-21 NOTE — TELEPHONE ENCOUNTER
----- Message from Alexa sent at 4/21/2025 11:02 AM CDT -----  Contact: self  Type:  Needs Medical AdviceWho Called: Marielos Frankel name and phone #:  [id number] - [place] - [address]  AT [facility] [id number] [address]Vizrq: [phone] Fax: [phone] the patient rather a call back or a response via [facility]? Call [facility] Call Back Number: [phone] Information: pt stating she is needing a medication to be called in for her after the hysterectomy procedure she had on 04/16

## 2025-04-21 NOTE — TELEPHONE ENCOUNTER
----- Message from Marleni sent at 4/21/2025  9:36 AM CDT -----  Type:  Needs Medical AdviceWho Called: Marielos Perla,Symptoms (please be specific):  lower abdominal pain  How long has patient had these symptoms:  since Wed ( procedure) Pharmacy name and phone #:  [id number] - [place], [place] - [address] AT [facility] & [id number] [address]Accjq: [phone] Fax: [phone] the patient rather a call back or a response via [facility]?  Best Call Back Number: [phone] Information: pt has procedure on Wed ( hysterectomy) needs to speak w/ nurse about pain medication please call

## 2025-04-22 ENCOUNTER — PATIENT MESSAGE (OUTPATIENT)
Dept: OBSTETRICS AND GYNECOLOGY | Facility: CLINIC | Age: 30
End: 2025-04-22
Payer: MEDICAID

## 2025-04-22 ENCOUNTER — TELEPHONE (OUTPATIENT)
Dept: OBSTETRICS AND GYNECOLOGY | Facility: CLINIC | Age: 30
End: 2025-04-22
Payer: MEDICAID

## 2025-04-22 RX ORDER — METRONIDAZOLE 500 MG/1
500 TABLET ORAL EVERY 12 HOURS
Qty: 14 TABLET | Refills: 0 | Status: SHIPPED | OUTPATIENT
Start: 2025-04-22 | End: 2025-04-29

## 2025-04-22 NOTE — TELEPHONE ENCOUNTER
----- Message from Micaela sent at 4/22/2025 10:25 AM CDT -----  Contact: TALISHA MAYER [[id number]]  ...Type:  Patient Returning CallWho Called: TALISHA MAYER [[id number]]What is the call regarding?: pt missed call and is returning the call.Would the patient rather a call back or a response via [facility]? [facility] Call Back Number: [phone] (home) Additional Information:

## 2025-04-23 ENCOUNTER — TELEPHONE (OUTPATIENT)
Dept: OBSTETRICS AND GYNECOLOGY | Facility: CLINIC | Age: 30
End: 2025-04-23
Payer: MEDICAID

## 2025-04-23 ENCOUNTER — PATIENT MESSAGE (OUTPATIENT)
Dept: OBSTETRICS AND GYNECOLOGY | Facility: CLINIC | Age: 30
End: 2025-04-23
Payer: MEDICAID

## 2025-04-24 ENCOUNTER — OFFICE VISIT (OUTPATIENT)
Dept: OBSTETRICS AND GYNECOLOGY | Facility: CLINIC | Age: 30
End: 2025-04-24
Payer: MEDICAID

## 2025-04-24 VITALS — DIASTOLIC BLOOD PRESSURE: 70 MMHG | SYSTOLIC BLOOD PRESSURE: 111 MMHG | WEIGHT: 151 LBS | BODY MASS INDEX: 29.49 KG/M2

## 2025-04-24 DIAGNOSIS — Z90.710 STATUS POST LAPAROSCOPIC HYSTERECTOMY: Primary | ICD-10-CM

## 2025-04-24 LAB
ABS NRBC COUNT: 0 X 10 3/UL (ref 0–0.01)
ABSOLUTE BASOPHIL: 0.05 X 10 3/UL (ref 0–0.22)
ABSOLUTE EOSINOPHIL: 0.38 X 10 3/UL (ref 0.04–0.54)
ABSOLUTE IMMATURE GRAN: 0.04 X 10 3/UL (ref 0–0.04)
ABSOLUTE LYMPHOCYTE: 2.13 X 10 3/UL (ref 0.86–4.75)
ABSOLUTE MONOCYTE: 0.8 X 10 3/UL (ref 0.22–1.08)
BASOPHILS NFR BLD: 0.4 % (ref 0.2–1.2)
EOSINOPHIL NFR BLD: 2.9 % (ref 0.7–7)
HCT VFR BLD AUTO: 37.5 % (ref 37–47)
HGB BLD-MCNC: 12.2 G/DL (ref 12–16)
IMMATURE GRANULOCYTES: 0.3 % (ref 0–0.5)
LYMPHOCYTES NFR BLD: 16.4 % (ref 19.3–53.1)
MCH RBC QN AUTO: 31.1 PG (ref 27–32)
MCHC RBC AUTO-ENTMCNC: 32.5 G/DL (ref 32–36)
MCV RBC AUTO: 95.7 FL (ref 82–100)
MONOCYTES NFR BLD: 6.2 % (ref 4.7–12.5)
NEUTROPHILS # BLD AUTO: 9.58 X 10 3/UL (ref 2.15–7.56)
NEUTROPHILS NFR BLD: 73.8 % (ref 34–71.1)
NUCLEATED RED BLOOD CELLS: 0 /100 WBC (ref 0–0.2)
PLATELET # BLD AUTO: 318 X 10 3/UL (ref 135–400)
RBC # BLD AUTO: 3.92 X 10 6/UL (ref 4.2–5.4)
RDW-SD: 45.8 FL (ref 37–54)
URINE CULTURE, ROUTINE: NORMAL
WBC # BLD: 12.98 X 10 3/UL (ref 4.3–10.8)

## 2025-04-24 PROCEDURE — 3078F DIAST BP <80 MM HG: CPT | Mod: CPTII,,, | Performed by: STUDENT IN AN ORGANIZED HEALTH CARE EDUCATION/TRAINING PROGRAM

## 2025-04-24 PROCEDURE — 1160F RVW MEDS BY RX/DR IN RCRD: CPT | Mod: CPTII,,, | Performed by: STUDENT IN AN ORGANIZED HEALTH CARE EDUCATION/TRAINING PROGRAM

## 2025-04-24 PROCEDURE — 1159F MED LIST DOCD IN RCRD: CPT | Mod: CPTII,,, | Performed by: STUDENT IN AN ORGANIZED HEALTH CARE EDUCATION/TRAINING PROGRAM

## 2025-04-24 PROCEDURE — 99024 POSTOP FOLLOW-UP VISIT: CPT | Mod: ,,, | Performed by: STUDENT IN AN ORGANIZED HEALTH CARE EDUCATION/TRAINING PROGRAM

## 2025-04-24 PROCEDURE — 3074F SYST BP LT 130 MM HG: CPT | Mod: CPTII,,, | Performed by: STUDENT IN AN ORGANIZED HEALTH CARE EDUCATION/TRAINING PROGRAM

## 2025-04-24 NOTE — PROGRESS NOTES
Chief Complaint: post op    HPI: Patient is a 29 y.o. y.o. female  who presents to GYN clinic for follow up.  Patient is status post laparoscopic hysterectomy presents today for follow up.  She was seen earlier this week complained of stabbing lower abdominal pain.  She reported having pain with urination bowel movements, she was given Levsin for abdominal spasms.  She reports today she feels much better, her pain has improved significantly.  She reports minimal vaginal bleeding.  She is ambulating, voiding, tolerating p.o. diet without difficulty now.  She has no new complaints.  Review of systems negative unless mentioned above.    Physical Exam:  Vitals:    25 1024   BP: 111/70   Weight: 68.5 kg (151 lb)     Gen: NAD, well developed, well nourished  Psych: alert and oriented x 3, normal affect  HEENT: normocephalic, atraumatic  Abd: soft, non-tender, non-distended  Skin: warm and dry  Ext: no c/c/c, moves all extremities  Neurological: normal gait, gross motor function intact    Assessment: Patient is a 29 y.o. y.o. female  with  Problem List[1]    Plan:  Patient is feeling much improved since her visit earlier this week   Her CBC was reviewed  She is continue postop restrictions   Patient to return to clinic for 5 weeks or sooner if needed    Gee Ramirez MD             [1]   Patient Active Problem List  Diagnosis    Cigarette smoker    h/o HSV (herpes simplex virus) anogenital infection    Tobacco use    Tetrahydrocannabinol (THC) use disorder, mild, abuse    Atypical glandular cells of undetermined significance (LIAM) on cervical Pap smear    KUSH III (cervical intraepithelial neoplasia III)